# Patient Record
Sex: FEMALE | Race: BLACK OR AFRICAN AMERICAN | NOT HISPANIC OR LATINO | Employment: FULL TIME | URBAN - METROPOLITAN AREA
[De-identification: names, ages, dates, MRNs, and addresses within clinical notes are randomized per-mention and may not be internally consistent; named-entity substitution may affect disease eponyms.]

---

## 2025-02-06 ENCOUNTER — OFFICE VISIT (OUTPATIENT)
Dept: URGENT CARE | Facility: CLINIC | Age: 35
End: 2025-02-06
Payer: COMMERCIAL

## 2025-02-06 VITALS
TEMPERATURE: 98 F | HEART RATE: 79 BPM | OXYGEN SATURATION: 98 % | DIASTOLIC BLOOD PRESSURE: 80 MMHG | SYSTOLIC BLOOD PRESSURE: 124 MMHG

## 2025-02-06 DIAGNOSIS — S01.81XA LACERATION OF FOREHEAD, INITIAL ENCOUNTER: Primary | ICD-10-CM

## 2025-02-06 DIAGNOSIS — S01.90XA OPEN HEAD INJURY, INITIAL ENCOUNTER: ICD-10-CM

## 2025-02-06 PROCEDURE — 90715 TDAP VACCINE 7 YRS/> IM: CPT | Performed by: PHYSICIAN ASSISTANT

## 2025-02-06 PROCEDURE — 12013 RPR F/E/E/N/L/M 2.6-5.0 CM: CPT | Performed by: PHYSICIAN ASSISTANT

## 2025-02-06 PROCEDURE — 90471 IMMUNIZATION ADMIN: CPT | Performed by: PHYSICIAN ASSISTANT

## 2025-02-06 PROCEDURE — 99203 OFFICE O/P NEW LOW 30 MIN: CPT | Performed by: PHYSICIAN ASSISTANT

## 2025-02-06 RX ORDER — PREDNISONE 20 MG/1
2 TABLET ORAL DAILY
COMMUNITY
Start: 2022-07-20 | End: 2025-02-06 | Stop reason: WASHOUT

## 2025-02-06 RX ORDER — HYDROCODONE BITARTRATE AND ACETAMINOPHEN 5; 325 MG/1; MG/1
TABLET ORAL EVERY 4 HOURS PRN
COMMUNITY
Start: 2021-01-11 | End: 2025-02-06 | Stop reason: WASHOUT

## 2025-02-06 RX ORDER — ERYTHROMYCIN 5 MG/G
OINTMENT OPHTHALMIC
COMMUNITY
Start: 2022-04-01 | End: 2025-02-06 | Stop reason: WASHOUT

## 2025-02-06 RX ORDER — NORETHINDRONE ACETATE AND ETHINYL ESTRADIOL .03; 1.5 MG/1; MG/1
TABLET ORAL
COMMUNITY
End: 2025-02-06 | Stop reason: WASHOUT

## 2025-02-06 RX ORDER — RELUGOLIX, ESTRADIOL HEMIHYDRATE, AND NORETHINDRONE ACETATE 40; 1; .5 MG/1; MG/1; MG/1
1 TABLET, FILM COATED ORAL
COMMUNITY
Start: 2024-10-08 | End: 2025-02-06 | Stop reason: WASHOUT

## 2025-02-06 RX ORDER — FLUCONAZOLE 150 MG/1
TABLET ORAL
COMMUNITY
Start: 2024-05-21 | End: 2025-02-06 | Stop reason: WASHOUT

## 2025-02-06 RX ORDER — NORELGESTROMIN AND ETHINYL ESTRADIOL 35; 150 UG/MG; UG/MG
PATCH TRANSDERMAL
COMMUNITY
End: 2025-02-06 | Stop reason: WASHOUT

## 2025-02-06 NOTE — LETTER
February 6, 2025     Patient: Swapna Hinton   YOB: 1990   Date of Visit: 2/6/2025       To Whom It May Concern:    Swapna Hinton was seen in my clinic on 2/6/2025 at 12:50 pm. Please excuse Swapna for her absence from work on this day to make the appointment.    If you have any questions or concerns, please don't hesitate to call.         Sincerely,         Huyen Wise PA-C        CC: No Recipients

## 2025-02-06 NOTE — PROGRESS NOTES
_____________MEDCO-14 Physician's Report of Work Ability [facility]-[id number]_________________      Injured Worker:  Date of injury: Claim number:   Swapna Hinton 02/06/2025 N/A     Date of last appointment /examination: Date of this appointment /examination:  Date of next appointment /examination:    N/A   02/06/25  N/A     Employer name: Injured worker's position of employment at time of injury:   [employer]     MEDCO-14 submission   1.  Please select one of the following options: I have never completed a MEDCO-14. Proceed to section 2.         Employment/Occupation (Complete this section and proceed to section 3)  2. Updates made to Employment/Occupation Section:     Have you reviewed the description of the injured worker's job held on the date of the injury (former position of employment)? Yes, job description provided by: Injured worker       Work Status/Injured worker's capabilities.   3a. Updates made to work status/injured worker's capabilities:     Does the injured worker have any physical or health restrictions related to allowed conditions in the claim? No, the injured worker is released to work as of the date of this exam. Proceed to Section 8     3b. If restrictions, can the injured worker return to full duties of his/her job held on the date of injury (former position of employment)?        3c. Please indicate which of the activities listed below the injured worker can perform (even if the response to 3B is No.)     The injured worker is not released to the former position of employment but may return to available and appropriate work with restrictions, the possible return to work date: .     The injured worker can perform simple grasping with:   The injured worker can perform repetitive wrist motion with:   The injured worker's dominant hand is:   The injured worker can perform repetitive actions to operate foot controls or motor vehicles with:     If the injured worker is taking  "prescribed medications for the allowed conditions in this claim, can the injured worker safely:   *Operate heavy machinery:   *Drive:   *Perform other critical job tasks as defined by any source listed above in section 2:      Please indicate the following: Never, Occasionally, Frequently, Continuously    Activity   Bend:   Squat / kneel:   Twist / turn:   Climb:  Reach above shoulder:   Type / Keyboard:   Work w/cold substances:   Work w/hot substances:       Lifting/Carrying                                      Pushing/pulling  0 - 10 lbs:   11 - 20 lbs:   21 - 40 lbs:   41 - 60 lbs:   61 - 100 lbs:  0 - 25 lbs:   26 - 40 lbs:   41 -  60 lbs:   61 - 100 lbs:   100 + lbs:        How many total hours can the injured worker work?       In an eight-hour workday, how many total hours is the injured worker potentially able to work?      Sit:    Walk:   Stand:           Does the injured worker have any functional restrictions based only on allowed psychological conditions?     *Note: If Yes is indicated please reference the MEDCO-16 as needed.     Additionally, in this space please provide any additional information addressing the  injured worker's capabilities and/or job accommodations which may not be addressed above.        Disability information   4a.  *Note: If 3B above is \"No\" or dates updated - all 4A fields, including site/location if applicable must be completed    Updates:     Complete the chart below and furnish the narrative description of the diagnosis(es), site/location, if applicable, and ICD code for the conditions being treated due to the work- related injury/disease.  Please indicate if the condition is preventing the injured worker from returning to job duties he/she held on the date of injury.       Narrative description of the work-related allowed condition Site/Location if applicable ICD Code Is the condition preventing full duty release to the job injured worker held on date of injury?              "                                4B. List all other relevant conditions that impact treatment of the conditions listed above (e.g., co-morbidities or not yet allowed conditions).        Clinical findings:    5. You can reference office notes in lieu of writing clinical findings below.  Updates:     The injured worker is progressing:     Provide your clinical and objective findings supporting your medical opinion outlined on this form.  List barriers to return to work and reason, for the injured worker's delay in recovery.        Maximum medical improvement (MMI):  6. Updates:     MMI is a treatment plateau (static or well-stabilized) at which no fundamental       functional or physiological change can be expected within reasonable medical       probability, in spite of continuing medical or rehabilitative procedures.     Has the work-related injury(s) or occupational disease reached MMI based on the definition above?     *Note: An injured worker may need supportive treatment to maintain his or her level of function after reaching MMI. Thus, periodic medical treatment may still be requested and provided.      Vocational rehabilitation:   7. Updates:     Vocational rehabilitation is an individualized and voluntary program for an eligible injured worker who needs assistance in safely returning to work or in retaining employment.  This program can be tailored around an injured worker's restrictions and may provide job seeking skills or necessary retraining. Is the injured worker a candidate for vocational rehabilitation services focusing on return to work?      Treating physician signature - mandatory:  8. I certify the information on this form is correct to the best of my knowledge. I am aware that any person who knowingly makes a false statement, misrepresentation, concealment of fact or any other act of fraud to obtain payment as provided by [facility], or who knowingly accepts payment to which that person is not entitled, is  subject to felony criminal prosecution and may be punished, under appropriate criminal provisions. by a fine or imprisonment or both.     Treating physician's name (please print legibly): Huyen Wise PA-C  [facility] provider ([place]) number: [id number]   Complete Address, Telephone, Fax number and Date:    [address] [place] [address]   P: [phone]  F: [phone]  Date: 02/06/2025    Treating physician's signature: Huyen Wise PA-C

## 2025-02-06 NOTE — PATIENT INSTRUCTIONS
We discussed sutures vs glue- there will be scarring either way. The laceration is not very deep, and the sutures will possibly leave additional wound scars/train-tracking. After shared decision-making, opted for glue repair.   Do not pick off the glue, do not get the glue directly wet under the shower. It will fall off on its own.   After the glue is placed and if it comes off after 12 hours of injury, we cannot re-repair the wound with sutures or additional glue, it will have to heal on its own at that time  If any new/concerning symptoms (please review laceration discharge instructions for examples), please call or report to ER   FROI will be sent to your managed care organization. A note was given stating that you were seen today for work.   Please review the head injury discharge summary - if severe headache, nausea/vomiting, vision changes, memory or behavioral changes or any worsening symptoms you should report to ER for CT right away   Tdap updated

## 2025-03-10 ENCOUNTER — TELEPHONE (OUTPATIENT)
Dept: URGENT CARE | Facility: CLINIC | Age: 35
End: 2025-03-10
Payer: COMMERCIAL